# Patient Record
Sex: FEMALE | Race: WHITE | Employment: UNEMPLOYED | URBAN - METROPOLITAN AREA
[De-identification: names, ages, dates, MRNs, and addresses within clinical notes are randomized per-mention and may not be internally consistent; named-entity substitution may affect disease eponyms.]

---

## 2019-10-21 ENCOUNTER — HOSPITAL ENCOUNTER (OUTPATIENT)
Dept: PHYSICAL THERAPY | Age: 64
Discharge: HOME | End: 2019-10-21
Payer: OTHER GOVERNMENT

## 2019-10-21 PROCEDURE — 97162 PT EVAL MOD COMPLEX 30 MIN: CPT

## 2019-10-21 PROCEDURE — 97110 THERAPEUTIC EXERCISES: CPT

## 2019-10-21 PROCEDURE — 97140 MANUAL THERAPY 1/> REGIONS: CPT

## 2019-10-21 NOTE — PROGRESS NOTES
Eleanor Esqu[address]  [facility] PHYSICAL THERAPY  [address] [facility] Conrad Palumbo, [address] - Phone: [phone]  Fax: [phone] / [address] [facility]  Patient Name: Rowan Garcia : 1955   Medical   Diagnosis: Left knee pain [M25.562] Treatment Diagnosis: S/P Left knee arthroscopic surgery for meniscus tear   Onset Date: DOI 2019  DOS 19     Referral Source: Carol Marinelli MD [facility]): 10/21/2019   Prior Hospitalization: See medical history Provider #: [id number]   Prior Level of Function: Functionally independent and working pain free   Comorbidities: HTN, Diabetes, OA, Overweight, Tobacco Use   Medications: Verified on Patient Summary List   The Plan of Care and following information is based on the information from the initial evaluation.   ===========================================================================================  Assessment / key information: Patient is a 60 y.o. female presenting to clinic with CC left anteromadial knee pain since surgery on 19. She reports requirement for surgery due to her knee buckling underneath her in 2019, but had a delay in establishing surgery date. Patient states she have been avoiding many loaded activities since her surgery aside from when she is working her 2 jobs. She is at times reliant on medication to \"get through the end of the day\", but tries to avoid having to take her medicine. She is currently unable to negotiate stairs, curbs, perform transfers without UE assistance, or don/doff socks without difficulty. She demonstrates an antalgic gait, and presents with her left knee AROM 8-100 degrees, PROM 4-107 degrees . Pt  will benefit from physical therapist management to address her impairments (listed below),  educate her, and improve her level of function.  Thanks for your referral. ===========================================================================================  Eval Complexity: History: MEDIUM  Complexity : 1-2 comorbidities / personal factors will impact the outcome/ POC Exam:MEDIUM Complexity : 3 Standardized tests and measures addressing body structure, function, activity limitation and / or participation in recreation  Presentation: MEDIUM Complexity : Evolving with changing characteristics  Clinical Decision Making:MEDIUM Complexity : FOTO score of 26-74Overall Complexity:MEDIUM  Problem List: pain affecting function, decrease ROM, decrease strength, impaired gait/ balance, decrease ADL/ functional abilitiies, decrease activity tolerance, decrease flexibility/ joint mobility and decrease transfer abilities  FOTO score: 24 indicating 76% functional disability  Treatment Plan may include any combination of the following: Therapeutic exercise, Therapeutic activities, Neuromuscular re-education, Physical agent/modality, Gait/balance training, Manual therapy, Aquatic therapy, Patient education, Self Care training, Functional mobility training, Home safety training and Stair training  Patient / Family readiness to learn indicated by: asking questions, trying to perform skills and interest  Persons(s) to be included in education: patient (P)  Barriers to Learning/Limitations: None  Measures taken: n/a   Patient Goal (s): \"walk normally\"   Patient self reported health status: good  Rehabilitation Potential: good   Short Term Goals: To be accomplished in  3  weeks:  1. Patient to be adherent to HEP to facilitate pain control with ADL's.  2. Patient to increase right knee AROM flexion to > 120 degrees to facilitate ease in doning/doffing. 3. Patient to improve 5x Sit <--> Stand score to < 20\" to indicate reduced risk for falls.  Long Term Goals: To be accomplished in  6  weeks:  1. Patient to be Safe and Independent with HEP to self-manage/prevent symptoms after DC.   2. Patient to demonstrate left LE SLS > 10\" to indicate increased safety and ability to maintain balance in preparation for cruise. 3. Patient to increase FOTO score to > 49 to indicate improved functional independence. 4. Patient to demonstrate safe stair negotiation with < 1UE assist in reciprocal pattern to facilitate ease in community mobility. 5. Patient to demonstrate a normalized gait with LRAD on even surfaces x > 200'. Frequency / Duration:   Patient to be seen  3  times per week for 6  weeks:  Patient / Caregiver education and instruction: activity modification and exercises. We reviewed our facility's Patient Personal Responsibilities (PPR) form, particularly in regards to compliance towards her appointment time, our attendance policy, and her home exercise program. Patient was informed of possible discharge for non-compliance to our attendance policy per PPR form. We also discussed her POC as deemed appropriate by the treating therapist and physician. Patient verbalized understanding that she must show objective and functional improvement in an appropriate time frame. Patient verbalized understanding that should progress or compliance be lacking, we will contact the referring physician for further consultation to address and attempt to establish alternate treatment strategies as necessary and/or possibly discharge. Therapist Signature: Byron Lauelor \"BJ\" Abe Sims DPT, Cert. MDT, Cert. DN, Cert. SMT Date: 35/11/7473   Certification Period: n/a Time: 3:17 PM   ===========================================================================================  I certify that the above Physical Therapy Services are being furnished while the patient is under my care. I agree with the treatment plan and certify that this therapy is necessary. Physician Signature:        Date:       Time:     Please sign and return to [facility] [employer] or you may fax the signed copy to [phone]. Thank you.

## 2019-10-21 NOTE — PROGRESS NOTES
PHYSICAL THERAPY - DAILY TREATMENT NOTE  Patient Name: Gold Donnelly        Date: 10/21/2019  : 1955   [x]  Patient  Verified  Visit #:   1     Insurance: Payor: DAVID / Plan: Ying Galan / Product Type:  /      In time:   2:33          Out time:   3:30 Total Treatment Time (min):   57   Medicare Time Tracking (below)   Total Timed Codes (min):  n/a 1:1 Treatment Time:  n/a     SUBJECTIVE    Pain Level (on 0 to 10 scale):  2  / 10   Medication Changes/New allergies or changes in medical history, any new surgeries or procedures? []  No    []  Yes   If yes, update Summary List:    Subjective Functional Status/Changes:  []  No changes reported     HISTORY    Present Symptoms: Left anteromedial knee pain    Present since: 2019,  [] Improving []  Unchanging []  Worsening          Commenced as a result of:    or []  No apparent reason   or Surgery DOS 2019. Reports did not have a follow-up with surgeon until 3 weeks post-op. Patient very upset with MD. States MD asked \"do you think you need PT?\". Patient reports it was a headache getting scheduled for sx. Had originally wanted to have surgery with [facility] as patient had prior sx on right knee but they no longer accept . Called david and was referred to Anurag Pineda. Constant symptoms: Ache, burning sensation    Intermittent symptoms: sharp pain    What produces or worsens: stair, curbs, donning/doffing shoes/socks.    Mornings are the worst.    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 Leisure: Mechanical Stresses: yard work. Functional disability from present episode: per above, at times has to take medicine to finish shift.      Summary:    [] Acute []  Sub-acute []  Chronic     [x] Trauma/Surgery []  Insidious onset        OBJECTIVE    Gait:  [] Normal    [] Abnormal    [x] Antalgic    [] NWB    Device:        Myotome Level Muscles Nerve Reflex Sensation Action   L1-L3 Iliopsoas T12/L1-3  Quadriceps L2-4  Adductors L2-L4 (Iliacus)- Femoral  Femoral  Obturator/Sciatic N/A  L3-4 = Patella L1- Inguinal Crease  L2- Anterior Thigh  L3- Anterior Thigh above knee Hip Flexion  Knee Extension   L4 Tibialis anterior L4&5   Deep Peroneal Patella Anterior Knee Suprapatellar Ankle Dorsiflexion   L5 Extensor Hallucis Longus  Extensor Digitorum Lungus  Gluteus Medius Deep Peroneal         Superior Gluteal None Reliable Dorsum of Foot/Great Toe  Anterior Shank Extensor  to Great Toe        Hip Internal Rotation   S1 Peroneus Longus  Gastrocnemius & Soleus  Gluteus Manjinder Superficial Peroneal  Tibial    Inferior Gluteal Achilles Tendon Lateral Shank around Lateral Malleolus  Lateral Aspect/Dorsum of GT   Plantar Flexion      Hip Extension   Notable findings from above: unremarkable    ROM / Strength  [] Unable to assess                  AROM                      PROM                   Strength (1-5)    Left Right Left Right Left Right   Hip Flexion     4- 4+    Extension     4- 4+    Abduction     4- 4+    Adduction     4- 4+         AROM          PROM                      Strength (1-5)    Left Right Left Right Left Right   Knee Flexion 100 123 107 128 5 5    Extension 8 0 4 0 3 5       Flexibility: [] Unable to assess at this time  Hamstrings:    (L) Tightness= [] WNL   [] Min   [] Mod   [] Severe    (R) Tightness= [] WNL   [] Min   [] Mod   [] Severe  Quadriceps:    (L) Tightness= [] WNL   [] Min [x] Mod   [] Severe    (R) Tightness= [x] WNL   [] Min   [] Mod   [] Severe  Gastroc:      (L) Tightness= [] WNL   [] Min   [x] Mod   [] Severe    (R) Tightness= [x] WNL   [] Min   [] Mod   [] Severe  Other:    Palpation:   Neg/Pos  Neg/Pos  Neg/Pos   Joint Line  Quad tendon  Patellar ligament    Patella  Fibular head  Pes Anserinus    Tibial tubercle  Hamstring tendons  Infrapatellar fat pad      Optional Tests:  Patellar Positioning (Static)   []L []R Normal []L []R Lateral   []L []R Pete Heys      []L []R Medial   []L []R [place]    Patellar Tracking   []L []R Glide (Lat)   []L []R Tilt (Lat)     []L []R Glide (Med)  []L []R Tilt (Med)      []L []R Tile (Inf)     Patellar Mobility   []L []R Hypermobile []L []R Hypomobile           Therapeutic Procedures:  Min Procedure Specifics + Rationale   n/a [x]  Patient Education (performed throughout session) [x] Review HEP    [] Progressed/Changed HEP based on:   [] proper performance and advancement of Therex/TA   [] reduction in pain level    [] increased functional capacity       [] change in directional preference   10 [x] Therapeutic Exercise   [x]  See Flowsheet   Rationale: increase ROM and increase strength to improve the patients ability to participate in ADL's    8 [x]  Manual Therapy [] STM/DTM     [] IASTM     [] Scar Massage  [] Cross Friction       [] PNF         [] PROM    [] TDN (see objective; actual needle insertion time not billed)   [] Joint mobilization: Gr I [] II []  III [] IV[] V[]:  Involved Knee Patellar G2-G4 mobs inferior/medial, STM to quads/HS/ITB interface, Ant/Post G3-G4 Tibofemoral glides, PROM flexion/Ext contract/relax  Rationale: decrease pain, increase ROM, increase tissue extensibility, decrease trigger points and increase postural awareness to attain functional use and participation with ADL's       [x] Skin assessment post-treatment:  [x]intact [x]redness- no adverse reaction       []redness  adverse reaction:         Post Treatment Pain Level (on 0 to 10) scale:   2 / 10     ASSESSMENT    Assessment/Changes in Function:   Justification for Eval Code Complexity:  Patient History : mod  Examination high  Clinical Presentation: mod  Clinical Decision Making : FOTO high   []  See Progress Note/Recertification   Patient will continue to benefit from skilled PT services to  modify and progress therapeutic interventions, address functional mobility deficits, address ROM deficits, address strength deficits, analyze and address soft tissue restrictions, analyze and cue movement patterns, analyze and modify body mechanics/ergonomics, assess and modify postural abnormalities and instruct in home and community integration to attain remaining goals       [x]  Upgrade activities as tolerated []  Continue plan of care   []  Discharge due to :    [x]  Other: Initiate POC     Therapist: Bev Gold \"YAMILKA\" Clara Becker DPT, Cert. MDT, Cert. DN, Cert.  SMT Date: 10/21/2019     Time: 2:34 PM

## 2019-10-23 ENCOUNTER — HOSPITAL ENCOUNTER (OUTPATIENT)
Dept: PHYSICAL THERAPY | Age: 64
Discharge: HOME | End: 2019-10-23
Payer: OTHER GOVERNMENT

## 2019-10-23 PROCEDURE — 97110 THERAPEUTIC EXERCISES: CPT

## 2019-10-23 NOTE — PROGRESS NOTES
PHYSICAL THERAPY - DAILY TREATMENT NOTE    Patient Name: Liz Emerson        Date: 10/23/2019  : 1955   YES Patient  Verified  Visit #:   2  of   8  Insurance: Payor: DAVID / Plan: Benjy Tyler 74 / Product Type:  /      In time: 1148 Out time: 344   Total Treatment Time: 30     Medicare/BCBS [place] Time Tracking (below)   Total Timed Codes (min):  NA 1:1 Treatment Time:  NA     TREATMENT AREA =  Left knee pain [M25.562]    SUBJECTIVE    Pain Level (on 0 to 10 scale):  6 / 10   Medication Changes/New allergies or changes in medical history, any new surgeries or procedures? NO    If yes, update Summary List   Subjective Functional Status/Changes:  []  No changes reported     \"It hurts, but I think it's the exercises. \"        OBJECTIVE  Therapeutic Procedures:  Min Procedure Specifics + Rationale   n/a [x]  Patient Education (performed throughout session) [x] Review HEP    [] Progressed/Changed HEP based on:   [] proper performance and advancement of Therex/TA   [] reduction in pain level    [] increased functional capacity       [] change in directional preference   30 [x] Therapeutic Exercise   [x]  See Flowsheet   Rationale: increase ROM and increase strength to improve the patients ability to participate in ADL's        Other Objective/Functional Measures:  Strong quad set LLE  Reviewed HEP of quad sets and heel slides  Added new exercises to HEP  Pain limiting WB exercises and activity tolerance     Post Treatment Pain Level (on 0 to 10) scale:  7  / 10     ASSESSMENT    Assessment/Changes in Function:   Increased left knee ROM to aid in gait mechanics      []  See Progress Note/Recertification   Patient will continue to benefit from skilled PT services to analyze, cue, progress, modify, demonstrate, instruct, and address, movement patterns, therapeutic interventions, postural abnormalities, soft tissue restrictions, ROM, strength, functional mobility, body mechanics/ergonomics, and home and community integration to attain remaining goals. Progress toward goals / Updated goals:  Progressing; initiated exercise program for lower extremity strength and flexibility  · Short Term Goals: To be accomplished in  3  weeks:  1. Patient to be adherent to HEP to facilitate pain control with ADL's.  2. Patient to increase right knee AROM flexion to > 120 degrees to facilitate ease in doning/doffing. 3. Patient to improve 5x Sit <--> Stand score to < 20\" to indicate reduced risk for falls. · Long Term Goals: To be accomplished in  6  weeks:  1. Patient to be Safe and Independent with HEP to self-manage/prevent symptoms after DC. 2. Patient to demonstrate left LE SLS > 10\" to indicate increased safety and ability to maintain balance in preparation for cruise. 3. Patient to increase FOTO score to > 49 to indicate improved functional independence. 4. Patient to demonstrate safe stair negotiation with < 1UE assist in reciprocal pattern to facilitate ease in community mobility. 5. Patient to demonstrate a normalized gait with LRAD on even surfaces x > 200'.      PLAN    [x]  Upgrade activities as tolerated YES Continue plan of care   []  Discharge due to :    []  Other:      Therapist: Severiano Montes, PT, DPT    Date: 10/23/2019 Time: 2:22 PM     Future Appointments   Date Time Provider Tristian Flores   10/23/2019  2:30 PM [address]   11/1/2019  1:00 PM Sofiya Diane [address] [place] [address]   11/4/2019  2:00 PM Sofiya Diane [address] [place] [address]   11/6/2019  2:00 PM Sofiya Diane PT [facility]   11/8/2019  1:30 PM Sofiya Diane PT [facility]   11/11/2019  2:00 PM Sofiya Diane [address] [place] [address]   11/13/2019  2:00 PM Sofiya Diane [address] [place] [address]   11/18/2019  2:00 PM Sofiya Diane [address] [place] [address]   11/20/2019  2:00 PM Sofiya Diane PT [facility]   11/22/2019  1:30 PM Sofiya Diane [address] [place] [address]   11/25/2019  2:00 PM Tarsha Moreno [facility]   11/27/2019  2:00 PM Sofiya Diane PT [facility]

## 2019-11-01 ENCOUNTER — HOSPITAL ENCOUNTER (OUTPATIENT)
Dept: PHYSICAL THERAPY | Age: 64
Discharge: HOME | End: 2019-11-01
Payer: OTHER GOVERNMENT

## 2019-11-01 PROCEDURE — 97140 MANUAL THERAPY 1/> REGIONS: CPT

## 2019-11-01 PROCEDURE — 97110 THERAPEUTIC EXERCISES: CPT

## 2019-11-01 NOTE — PROGRESS NOTES
PHYSICAL THERAPY - DAILY TREATMENT NOTE    Patient Name: Sylvia Moncada        Date: 2019  : 1955   YES Patient  Verified  Visit #:   3   of   8  Insurance: Payor: DAVID / Plan: Benjy Tyler 74 / Product Type:  /      In time: 1:03 Out time: 1:35   Total Treatment Time: 32     TREATMENT AREA = Left knee pain [M25.562]    SUBJECTIVE    Pain Level (on 0 to 10 scale):  2  / 10   Medication Changes/New allergies or changes in medical history, any new surgeries or procedures? NO    If yes, update Summary List   Subjective Functional Status/Changes:  []  No changes reported     \"I've been doing the exercises and think they've helped me tremendously. I was actually able to go down the steps normally today. It was slow but a lot better than before. \"          OBJECTIVE    Therapeutic Procedures:  Min Procedure Specifics + Rationale   n/a [x]  Patient Education (performed throughout session) [x] Review HEP    [] Progressed/Changed HEP based on:   [] proper performance and advancement of Therex/TA   [] reduction in pain level    [] increased functional capacity       [] change in directional preference   24 [x] Therapeutic Exercise   [x]  See Flowsheet   Rationale: increase ROM and increase strength to improve the patients ability to participate in ADL's    8 []  Manual Therapy       [] IASTM  Involved Knee Patellar G2-G4 mobs inferior/medial, STM to quads/HS/ITB interface, Ant/Post G3-G4 Tibofemoral glides, PROM flexion/Ext contract/relax  [] TDN (see objective; actual needle insertion time not billed)     Rationale: decrease pain, increase ROM, increase tissue extensibility, decrease trigger points and increase postural awareness to attain functional use and participation with ADL's  [x] Skin assessment post-treatment:  [x]intact []redness- no adverse reaction   []redness  adverse         Other Objective/Functional Measures:    Increased reps/sets/resistance per flow sheet.        Post Treatment Pain Level (on 0 to 10) scale:   0  / 10     ASSESSMENT    Assessment/Changes in Function:     Responded well towards manual to facilitate mobility and pain relief. Patient will continue to benefit from skilled PT services to modify and progress therapeutic interventions, address functional mobility deficits, address ROM deficits, address strength deficits, analyze and address soft tissue restrictions, analyze and cue movement patterns, analyze and modify body mechanics/ergonomics, assess and modify postural abnormalities, address imbalance/dizziness and instruct in home and community integration  to attain remaining goals   Progress toward goals / Updated goals:    Progressing well with HEP compliance and strengthening     PLAN    [x]  Upgrade activities as tolerated  [x]  Update interventions per flow sheet YES Continue plan of care   []  Discharge due to :    []  Other:      Therapist: Cuca Jacques \"YAMILKA\" Lory Leon DPT, Cert. MDT, Cert. DN, Cert.  SMT    Date: 11/1/2019 Time: 1:18 PM     Future Appointments   Date Time Provider Tristian Flores   11/4/2019  2:00 PM Candis Fortune [facility]   11/6/2019  2:00 PM Candis Fortune [facility]   11/8/2019  1:30 PM Candis Martinezs, [address] [place] [address]   11/11/2019  2:00 PM Candis Martinezs, [address] [place] [address]   11/13/2019  2:00 PM Candis Martinezs, [address] [place] [address]   11/18/2019  2:00 PM Candis [address]   11/20/2019  2:00 PM Candis Fortune [facility]   11/22/2019  1:30 PM Candis Martinezs, [address] [place] [address]   11/25/2019  2:00 PM Candis [address]   11/27/2019  2:00 PM Candis Fortune [facility]

## 2019-11-06 ENCOUNTER — HOSPITAL ENCOUNTER (OUTPATIENT)
Dept: PHYSICAL THERAPY | Age: 64
Discharge: HOME | End: 2019-11-06
Payer: OTHER GOVERNMENT

## 2019-11-06 PROCEDURE — 97530 THERAPEUTIC ACTIVITIES: CPT

## 2019-11-06 PROCEDURE — 97110 THERAPEUTIC EXERCISES: CPT

## 2019-11-06 NOTE — PROGRESS NOTES
PHYSICAL THERAPY - DAILY TREATMENT NOTE    Patient Name: Jarad Baker        Date: 2019  : 1955   YES Patient  Verified  Visit #:   5   of   8  Insurance: Payor: DAVDI / Plan: Benjy Tyler 74 / Product Type:  /      In time: 1:53 Out time: 2:35   Total Treatment Time: 42     TREATMENT AREA = Left knee pain [M25.562]    SUBJECTIVE    Pain Level (on 0 to 10 scale):  2  / 10   Medication Changes/New allergies or changes in medical history, any new surgeries or procedures? NO    If yes, update Summary List   Subjective Functional Status/Changes:  []  No changes reported     \"I was fine when I left here but about 2 hours later I was sore.  It's definitely eased up\"          OBJECTIVE    Therapeutic Procedures:  Min Procedure Specifics + Rationale   n/a [x]  Patient Education (performed throughout session) [x] Review HEP    [] Progressed/Changed HEP based on:   [] proper performance and advancement of Therex/TA   [] reduction in pain level    [] increased functional capacity       [] change in directional preference   23 [x] Therapeutic Exercise   [x]  See Flowsheet   Rationale: increase ROM and increase strength to improve the patients ability to participate in ADL's      9 min Therapeutic Activity: Posture, positioning, and transfers     Rationale:    improve coordination, improve balance and increase proprioception        Modality rationale: decrease inflammation, decrease pain, increase tissue extensibility and increase muscle contraction/control to improve the patients ability to perform ADL's with greater ease       Min Type Additional Details   10 [x]  Cold Pack   [] pre-LAURA      [x] post-LAURA  []  Ice massage Location:left knee    [x] supine              [] prone  [] legs elevated    [x] legs flat   [] sitting   [x] Skin assessment post-treatment:  [x]intact [x]redness- no adverse reaction       []redness  adverse reaction:     Other Objective/Functional Measures:    Increased reps/sets/resistance per flow sheet. Post Treatment Pain Level (on 0 to 10) scale:   1  / 10     ASSESSMENT    Assessment/Changes in Function:     Performed/participated in treatment well without complaints aside from muscular fatigue/deconditioning, indicating (+) response to current course of treatment to further improve functional status. Patient will continue to benefit from skilled PT services to modify and progress therapeutic interventions, address functional mobility deficits, address ROM deficits, address strength deficits, analyze and address soft tissue restrictions, analyze and cue movement patterns, analyze and modify body mechanics/ergonomics and instruct in home and community integration  to attain remaining goals   Progress toward goals / Updated goals:    Improving in ROM for donning/doffing     PLAN    [x]  Upgrade activities as tolerated  [x]  Update interventions per flow sheet YES Continue plan of care   []  Discharge due to :    []  Other:      Therapist: Dianna Salguero \"BJ\" [address], DPT, Cert. MDT, Cert. DN, Cert.  SMT    Date: 11/6/2019 Time: 2:12 PM     Future Appointments   Date Time Provider Tristian Flores   11/8/2019  1:30 PM Rhiannon Sheerer, [address] [place] [address]   11/11/2019  2:00 PM Rhiannon Sheerer, PT [facility]   11/13/2019  2:00 PM Rhiannon Sheerer, [address] [place] [address]   11/18/2019  2:00 PM Rhiannon Sheerer, [address] [place] [address]   11/20/2019  2:00 PM Rhiannonjohn Powerr, PT [facility]   11/22/2019  1:30 PM Rhiannon Sheerer, [address] [place] [address]   11/25/2019  2:00 PM Rhiannon Sheerer, [address] [place] [address]   11/27/2019  2:00 PM Rhiannon Sheerer, PT [facility]

## 2019-11-08 ENCOUNTER — HOSPITAL ENCOUNTER (OUTPATIENT)
Dept: PHYSICAL THERAPY | Age: 64
Discharge: HOME | End: 2019-11-08
Payer: OTHER GOVERNMENT

## 2019-11-08 PROCEDURE — 97110 THERAPEUTIC EXERCISES: CPT

## 2019-11-08 PROCEDURE — 97530 THERAPEUTIC ACTIVITIES: CPT

## 2019-11-08 NOTE — PROGRESS NOTES
PHYSICAL THERAPY - DAILY TREATMENT NOTE    Patient Name: Shawn Roles        Date: 2019  : 1955   YES Patient  Verified  Visit #:     Insurance: Payor: DAVID / Plan: Benjy Tyler 74 / Product Type:  /      In time: 1:25 Out time: 2:05   Total Treatment Time: 40     TREATMENT AREA = Left knee pain [M25.562]    SUBJECTIVE    Pain Level (on 0 to 10 scale):  0  / 10   Medication Changes/New allergies or changes in medical history, any new surgeries or procedures? NO    If yes, update Summary List   Subjective Functional Status/Changes:  []  No changes reported     \"Felt pretty good yesterday and today I've had no pain at all. \"          OBJECTIVE    Therapeutic Procedures:  Min Procedure Specifics + Rationale   n/a [x]  Patient Education (performed throughout session) [x] Review HEP    [] Progressed/Changed HEP based on:   [] proper performance and advancement of Therex/TA   [] reduction in pain level    [] increased functional capacity       [] change in directional preference   32 [x] Therapeutic Exercise   [x]  See Flowsheet   Rationale: increase ROM and increase strength to improve the patients ability to participate in ADL's    8 [x] Therapeutic Activity   [x]  See Flowsheet  Step ups, HK,retro,sidestep  Rationale: To improve safety, proprioception, coordination, and efficiency with tasks         Other Objective/Functional Measures:    Increased reps/sets/resistance per flow sheet. Post Treatment Pain Level (on 0 to 10) scale:   0  / 10     ASSESSMENT    Assessment/Changes in Function:     Notable poor proprioception and kinesthetic awareness with ambulation activities.          Patient will continue to benefit from skilled PT services to address functional mobility deficits, address ROM deficits, address strength deficits, analyze and address soft tissue restrictions, analyze and cue movement patterns, analyze and modify body mechanics/ergonomics and instruct in home and community integration  to attain remaining goals   Progress toward goals / Updated goals:    Improving well in pain and strength      PLAN    [x]  Upgrade activities as tolerated  [x]  Update interventions per flow sheet YES Continue plan of care   []  Discharge due to :    []  Other:      Therapist: Heather Chen \"BJ\" ROSY Tse, Cert. MDT, Cert. DN, Cert.  SMT    Date: 11/8/2019 Time: 1:28 PM     Future Appointments   Date Time Provider Tristian Flores   11/8/2019  1:30 PM Lionel Hernandez [facility]   11/11/2019  2:00 PM Lionel Hernandez [facility]   11/13/2019  2:00 PM Lionel Hernandez [address] [place] [address]   11/18/2019  2:00 PM Lionel Hernandez, [address] [place] [address]   11/20/2019  2:00 PM Lionel Hernandez [facility]   11/22/2019  1:30 PM Lionel Hernandez [address] [place] [address]   11/25/2019  2:00 PM Lionel Hernandez [address] [place] [address]   11/27/2019  2:00 PM Lionel Hernandez [facility]

## 2019-11-11 ENCOUNTER — HOSPITAL ENCOUNTER (OUTPATIENT)
Dept: PHYSICAL THERAPY | Age: 64
Discharge: HOME | End: 2019-11-11
Payer: OTHER GOVERNMENT

## 2019-11-11 PROCEDURE — 97530 THERAPEUTIC ACTIVITIES: CPT

## 2019-11-11 PROCEDURE — 97110 THERAPEUTIC EXERCISES: CPT

## 2019-11-11 NOTE — PROGRESS NOTES
PHYSICAL THERAPY - DAILY TREATMENT NOTE    Patient Name: Rasheeda Bacon        Date: 2019  : 1955   YES Patient  Verified  Visit #:   7   of   8  Insurance: Payor: DAVID / Plan: Benjy Tyler 74 / Product Type:  /      In time: 2:06 Out time: 2:43   Total Treatment Time: 37       TREATMENT AREA = Left knee pain [M25.562]    SUBJECTIVE    Pain Level (on 0 to 10 scale):  2  / 10   Medication Changes/New allergies or changes in medical history, any new surgeries or procedures? 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 I'm not as slow getting in and out of the car. I'm better at that. \"          OBJECTIVE     Therapeutic Procedures:  Min Procedure Specifics + Rationale   n/a [x]  Patient Education (performed throughout session) [x] Review HEP    [] Progressed/Changed HEP based on:   [] proper performance and advancement of Therex/TA   [] reduction in pain level    [] increased functional capacity       [] change in directional preference   27 [x] Therapeutic Exercise   [x]  See Flowsheet   Rationale: increase ROM and increase strength to improve the patients ability to participate in ADL's    10(10) [x] Therapeutic Activity   [x]  See Flowsheet  Zig Zag, in/out sidestep, in/out forward, sidestepping, HK  Sit <-->stand transfers  MSR to GT EO/EC  Rationale: To improve safety, proprioception, coordination, and efficiency with tasks     Modality rationale: decrease inflammation, decrease pain, increase tissue extensibility and increase muscle contraction/control to improve the patients ability to perform ADL's with greater ease       Min Type Additional Details       Other Objective/Functional Measures: LoB x2 sidestepping to left.       Post Treatment Pain Level (on 0 to 10) scale:   0  / 10     ASSESSMENT    Assessment/Changes in Function:       Will re-assess NV         Patient will continue to benefit from skilled PT services to modify and progress therapeutic interventions, address ROM deficits, analyze and address soft tissue restrictions, analyze and cue movement patterns, analyze and modify body mechanics/ergonomics, assess and modify postural abnormalities, address imbalance/dizziness and instruct in home and community integration  to attain remaining goals   Progress toward goals / Updated goals: Will re-assess NV       PLAN    [x]  Upgrade activities as tolerated  [x]  Update interventions per flow sheet YES Continue plan of care   []  Discharge due to :    []  Other:      Therapist: Rolo Morfin \"BJ\" Dedrick, [place], Cert. MDT, Cert. DN, Cert.  SMT    Date: 11/11/2019 Time: 1:50 PM     Future Appointments   Date Time Provider Tristian Flores   11/11/2019  2:00 PM Kyle Velásquez [facility]   11/13/2019  2:00 PM Kyle Velásquez [address] [place] [address]   11/18/2019  2:00 PM Kyle Velásquez [address] [place] [address]   11/20/2019  2:00 PM Kyle Velásquez PT [facility]   11/22/2019  1:30 PM Kyle Velásquez [address] [place] [address]   11/25/2019  2:00 PM Kyle Velásquez [address] [place] [address]   11/27/2019  2:00 PM Kyle Velásquez [facility]

## 2019-11-13 ENCOUNTER — HOSPITAL ENCOUNTER (OUTPATIENT)
Dept: PHYSICAL THERAPY | Age: 64
Discharge: HOME | End: 2019-11-13
Payer: OTHER GOVERNMENT

## 2019-11-13 PROCEDURE — 97110 THERAPEUTIC EXERCISES: CPT

## 2019-11-13 NOTE — PROGRESS NOTES
PHYSICAL THERAPY - DAILY TREATMENT NOTE    Patient Name: Kat Blair        Date: 2019  : 1955   YES Patient  Verified  Visit #:     Insurance: Payor:  / Plan: Benjy Tyler 74 / Product Type:  /      In time: 2:00 Out time: 2:40   Total Treatment Time: 40     TREATMENT AREA = Left knee pain [M25.562]    SUBJECTIVE    Pain Level (on 0 to 10 scale):  0  / 10   Medication Changes/New allergies or changes in medical history, any new surgeries or procedures? NO    If yes, update Summary List   Subjective Functional Status/Changes:  []  No changes reported     \"I was sore yesterday but more b/c of the weather I think. \"          OBJECTIVE    Therapeutic Procedures:  Min Procedure Specifics + Rationale   n/a [x]  Patient Education (performed throughout session) [x] Review HEP    [] Progressed/Changed HEP based on:   [] proper performance and advancement of Therex/TA   [] reduction in pain level    [] increased functional capacity       [] change in directional preference   40 [x] Therapeutic Exercise   [x]  See Flowsheet   Rationale: increase ROM and increase strength to improve the patients ability to participate in ADL's          Other Objective/Functional Measures:    Increased reps/sets/resistance per flow sheet. Added step ups  Added captain morgans   Post Treatment Pain Level (on 0 to 10) scale:   0  / 10     ASSESSMENT    Assessment/Changes in Function:     Performed/participated in treatment well without complaints aside from muscular fatigue/deconditioning, indicating (+) response to current course of treatment to further improve functional status.             Patient will continue to benefit from skilled PT services to modify and progress therapeutic interventions, address functional mobility deficits, address ROM deficits, address strength deficits, analyze and address soft tissue restrictions, analyze and cue movement patterns, analyze and modify body mechanics/ergonomics and instruct in home and community integration  to attain remaining goals   Progress toward goals / Updated goals:    Excellent progress in level of ADL tolerance     PLAN    [x]  Upgrade activities as tolerated  [x]  Update interventions per flow sheet YES Continue plan of care   []  Discharge due to :    []  Other:      Therapist: Heather Chen \"BJ\" ROSY Tse, Cert. MDT, Cert. DN, Cert.  SMT    Date: 11/13/2019 Time: 2:08 PM     Future Appointments   Date Time Provider Tristian Flores   11/18/2019  2:00 PM Lionel Hernandez [facility]   11/20/2019  2:00 PM Lionel Hernandez [facility]   11/22/2019  1:30 PM Lionel Hernandez [address] [place] [address]   11/25/2019  2:00 PM Lionel Hernandez [address] [place] [address]   11/27/2019  2:00 PM Lionel Hernandez PT [facility]

## 2019-11-18 ENCOUNTER — HOSPITAL ENCOUNTER (OUTPATIENT)
Dept: PHYSICAL THERAPY | Age: 64
End: 2019-11-18
Payer: OTHER GOVERNMENT

## 2019-11-20 ENCOUNTER — HOSPITAL ENCOUNTER (OUTPATIENT)
Dept: PHYSICAL THERAPY | Age: 64
Discharge: HOME | End: 2019-11-20
Payer: OTHER GOVERNMENT

## 2019-11-20 PROCEDURE — 97014 ELECTRIC STIMULATION THERAPY: CPT

## 2019-11-20 PROCEDURE — 97110 THERAPEUTIC EXERCISES: CPT

## 2019-11-20 PROCEDURE — 97140 MANUAL THERAPY 1/> REGIONS: CPT

## 2019-11-20 PROCEDURE — 97530 THERAPEUTIC ACTIVITIES: CPT

## 2019-11-20 NOTE — PROGRESS NOTES
PHYSICAL THERAPY - DAILY TREATMENT NOTE    Patient Name: Teofilo Romberg        Date: 2019  : 1955   YES Patient  Verified  Visit #:     Insurance: Payor: DAVID / Plan: Michele Shaffer / Product Type:  /      In time: 1:50 Out time: 2:43   Total Treatment Time: 53     TREATMENT AREA = Left knee pain [M25.562]    SUBJECTIVE    Pain Level (on 0 to 10 scale):  7   10   Medication Changes/New allergies or changes in medical history, any new surgeries or procedures? NO    If yes, update Summary List   Subjective Functional Status/Changes:  []  No changes reported     \"I don't know what's been going on but since last time I've been hurting a lot. I'm stressed out, too b/c my roof is leaking and I've got to get rid of a tree. \"          OBJECTIVE    Therapeutic Procedures:  Min Procedure Specifics + Rationale   n/a [x]  Patient Education (performed throughout session) [x] Review HEP    [] Progressed/Changed HEP based on:   [] proper performance and advancement of Therex/TA   [] reduction in pain level    [] increased functional capacity       [] change in directional preference   15 [x] Therapeutic Exercise   [x]  See Flowsheet   Rationale: increase ROM and increase strength to improve the patients ability to participate in ADL's    15 []  Manual Therapy       [] IASTM    [] TDN (see objective; actual needle insertion time not billed)   [x] Involved Knee Patellar G2-G4 mobs inferior/medial, STM to quads/HS/ITB interface, Ant/Post G3-G4 Tibofemoral glides, PROM flexion/Ext contract/relax    Rationale: decrease pain, increase ROM, increase tissue extensibility, decrease trigger points and increase postural awareness to attain functional use and participation with ADL's  [x] Skin assessment post-treatment:  [x]intact []redness- no adverse reaction   []redness  adverse   8 [x] Therapeutic Activity   [x]  See Flowsheet  Assessment for knee directional preference  Rationale:  To improve safety, proprioception, coordination, and efficiency with tasks     Modality rationale: decrease inflammation, decrease pain, increase tissue extensibility and increase muscle contraction/control to improve the patients ability to perform ADL's with greater ease       Min Type Additional Details   15 [x] E-Stim Type:Symmetrical Biphasic  Attended? NO Location: Left knee  [x] supine              [] prone  [x] legs elevated   [x] legs flat  []w/heat  [x]w/ice  [] sitting    [x] Skin assessment post-treatment:  [x]intact [x]redness- no adverse reaction       []redness  adverse reaction:     Other Objective/Functional Measures:  Patient provided educational material on road to recovery. See PN     Post Treatment Pain Level (on 0 to 10) scale:   1  / 10     ASSESSMENT    Assessment/Changes in Function:     See PN         Patient will continue to benefit from skilled PT services to modify and progress therapeutic interventions, address functional mobility deficits, address ROM deficits, address strength deficits, analyze and address soft tissue restrictions, analyze and cue movement patterns, analyze and modify body mechanics/ergonomics and instruct in home and community integration  to attain remaining goals   Progress toward goals / Updated goals:    See PN     PLAN    [x]  Upgrade activities as tolerated  [x]  Update interventions per flow sheet YES Continue plan of care   []  Discharge due to :    []  Other:      Therapist: Ashley Arthur \"BJ\" Eric Regalado DPT, Cert. MDT, Cert. DN, Cert.  SMT    Date: 11/20/2019 Time: 1:59 PM     Future Appointments   Date Time Provider Tristian Flores   11/20/2019  2:00 PM Elise Franks PT [facility]   11/22/2019  1:30 PM Elise Franks [address] [place] [address]   11/25/2019  2:00 PM Elise Franks [address] [place] [address]   11/27/2019  2:00 PM Elise Franks PT [facility]

## 2019-11-22 ENCOUNTER — HOSPITAL ENCOUNTER (OUTPATIENT)
Dept: PHYSICAL THERAPY | Age: 64
Discharge: HOME | End: 2019-11-22
Payer: OTHER GOVERNMENT

## 2019-11-22 PROCEDURE — 97110 THERAPEUTIC EXERCISES: CPT

## 2019-11-22 PROCEDURE — 97014 ELECTRIC STIMULATION THERAPY: CPT

## 2019-11-22 PROCEDURE — 97530 THERAPEUTIC ACTIVITIES: CPT

## 2019-11-22 NOTE — PROGRESS NOTES
PHYSICAL THERAPY - DAILY TREATMENT NOTE    Patient Name: Kat Blair        Date: 2019  : 1955   YES Patient  Verified  Visit #:   10   of   18  Insurance: Payor: DAVID / Plan: Benjy Tyler 74 / Product Type:  /      In time: 1:42 Out time: 2:30   Total Treatment Time: 48     TREATMENT AREA = Left knee pain [M25.562]    SUBJECTIVE    Pain Level (on 0 to 10 scale):  1  / 10   Medication Changes/New allergies or changes in medical history, any new surgeries or procedures? NO    If yes, update Summary List   Subjective Functional Status/Changes:  []  No changes reported     \"sorry I'm late. I thought my appointment was at 2 like usual. I've barely had any pain since last time. \"          OBJECTIVE    Therapeutic Procedures:  Min Procedure Specifics + Rationale   n/a [x]  Patient Education (performed throughout session) [x] Review HEP    [] Progressed/Changed HEP based on:   [] proper performance and advancement of Therex/TA   [] reduction in pain level    [] increased functional capacity       [] change in directional preference   10 [x] Therapeutic Exercise   [x]  See Flowsheet   Rationale: increase ROM and increase strength to improve the patients ability to participate in ADL's    25 [x] Therapeutic Activity   [x]  See Flowsheet  Tap ups, HK/Retro/Sidestep, tandem  Rationale: To improve safety, proprioception, coordination, and efficiency with tasks     Modality rationale: decrease inflammation, decrease pain, increase tissue extensibility and increase muscle contraction/control to improve the patients ability to perform ADL's with greater ease       Min Type Additional Details   15 [x] E-Stim Type:IFC  Attended? NO Location: Left Knee  [] supine              [] prone  [] legs elevated   [] legs flat  []w/heat  []w/ice  [] sitting    [x] Skin assessment post-treatment:  [x]intact [x]redness- no adverse reaction       []redness  adverse reaction:     Other Objective/Functional Measures:    Increased reps/sets/resistance per flow sheet. Post Treatment Pain Level (on 0 to 10) scale:   0  / 10     ASSESSMENT    Assessment/Changes in Function:     Improved standing activity tolernace         Patient will continue to benefit from skilled PT services to modify and progress therapeutic interventions, address functional mobility deficits, address ROM deficits, address strength deficits, analyze and address soft tissue restrictions, analyze and cue movement patterns, analyze and modify body mechanics/ergonomics and instruct in home and community integration  to attain remaining goals   Progress toward goals / Updated goals:    See PN     PLAN    [x]  Upgrade activities as tolerated  [x]  Update interventions per flow sheet YES Continue plan of care   []  Discharge due to :    []  Other:      Therapist: Erum Leo \"BJ\" Severa Pike, DPT, Cert. MDT, Cert. DN, Cert.  SMT    Date: 11/22/2019 Time: 1:59 PM     Future Appointments   Date Time Provider Tristian Flores   11/25/2019  2:00 PM Pako Mcgrath PT [facility]   11/27/2019  2:00 PM Pako Mcgrath PT [facility]

## 2019-11-25 ENCOUNTER — HOSPITAL ENCOUNTER (OUTPATIENT)
Dept: PHYSICAL THERAPY | Age: 64
Discharge: HOME | End: 2019-11-25
Payer: OTHER GOVERNMENT

## 2019-11-25 PROCEDURE — 97530 THERAPEUTIC ACTIVITIES: CPT

## 2019-11-25 PROCEDURE — 97110 THERAPEUTIC EXERCISES: CPT

## 2019-11-25 NOTE — PROGRESS NOTES
PHYSICAL THERAPY - DAILY TREATMENT NOTE    Patient Name: Troy Stage        Date: 2019  : 1955   YES Patient  Verified  Visit #:     Insurance: Payor: DAVID / Plan: Benjy Tyler 74 / Product Type:  /      In time: 2:03 Out time: 2:38   Total Treatment Time: 35     TREATMENT AREA = Left knee pain [M25.562]    SUBJECTIVE    Pain Level (on 0 to 10 scale):  1  / 10   Medication Changes/New allergies or changes in medical history, any new surgeries or procedures? NO    If yes, update Summary List   Subjective Functional Status/Changes:  []  No changes reported     \"Saw my primary and I told her not to ever refer Dr. Shar Sandoval again. She said that if I'm not better in a few weeks she wants to do an MRI to make sure he did the surgery right. Someone must've been talking to Leisa because he apparently wanted to meet his referers. My PCP was\"          OBJECTIVE    Therapeutic Procedures:  Min Procedure Specifics + Rationale   n/a [x]  Patient Education (performed throughout session) [x] Review HEP    [] Progressed/Changed HEP based on:   [] proper performance and advancement of Therex/TA   [] reduction in pain level    [] increased functional capacity       [] change in directional preference   25 [x] Therapeutic Exercise   [x]  See Flowsheet   Rationale: increase ROM and increase strength to improve the patients ability to participate in ADL's    10 [x] Therapeutic Activity   [x]  See Flowsheet  HK/Retro/  Rationale:  To improve safety, proprioception, coordination, and efficiency with tasks       Other Objective/Functional Measures:    Discussed DC NV, patient agreeable       Post Treatment Pain Level (on 0 to 10) scale:   0  / 10     ASSESSMENT    Assessment/Changes in Function:     Improved SLS stance         Patient will continue to benefit from skilled PT services to modify and progress therapeutic interventions, address functional mobility deficits, address ROM deficits, address strength deficits, analyze and address soft tissue restrictions, analyze and cue movement patterns, analyze and modify body mechanics/ergonomics, assess and modify postural abnormalities, address imbalance/dizziness and instruct in home and community integration  to attain remaining goals   Progress toward goals / Updated goals:    Progressing towards DC     PLAN    [x]  Upgrade activities as tolerated  [x]  Update interventions per flow sheet YES Continue plan of care   []  Discharge due to :    []  Other:      Therapist: Franco Coyle \"BJ\" ROSY Tse, Cert. MDT, Cert. DN, Cert.  SMT    Date: 11/25/2019 Time: 2:22 PM     Future Appointments   Date Time Provider Tristian Flores   11/27/2019  2:00 PM Perfecto Councilman, [facility] AT [facility]

## 2019-11-27 ENCOUNTER — HOSPITAL ENCOUNTER (OUTPATIENT)
Dept: PHYSICAL THERAPY | Age: 64
Discharge: HOME | End: 2019-11-27
Payer: OTHER GOVERNMENT

## 2019-11-27 PROCEDURE — 97530 THERAPEUTIC ACTIVITIES: CPT

## 2019-11-27 PROCEDURE — 97110 THERAPEUTIC EXERCISES: CPT

## 2019-11-27 NOTE — PROGRESS NOTES
Eleanor Kwon [address]  [facility] PHYSICAL THERAPY  [address] [facility] [address], [address] - Phone: [phone]  Fax: [phone]  DISCHARGE SUMMARY  Patient Name: Sylvia Moncada : 1955   Treatment/Medical Diagnosis: Left knee pain [M25.562]   Referral Source: Chaim Pillai MD     Date of Initial Visit: 10/21/19 Attended Visits: 12 Missed Visits: 0     SUMMARY OF TREATMENT  Patient's POC has consisted of therex, therapeutic activities, manual therapy prn, modalities prn, pt. education, and a comprehensive HEP. Treatment strategies used to address functional mobility deficits, ROM deficits, strength deficits, analyze and address soft tissue restrictions, analyze and cue movement patterns, analyze and modify body mechanics/ergonomics, assess and modify postural abnormalities and instruct in home and community integration. CURRENT STATUS  Patient made excellent progress in her recovery despite one incident of exacerbating pain after increased activity. She was able to progressively alleviate and abolish symptoms via PT, and returned to her norm of 0-1/10 pain on VAS. She reports CC as \"stiffness\" in am and \"soreness\" at end of the day, but is otherwise able to perform all ADL's without complaints. She currently demonstrates left knee AROM 0-125 degrees, PROM 0-130 degrees  5x Sit <-->stand score=  17\", compared to being unable to perform at [facility]. Her score indicates that she is freely mobile and is a reduced risk for falls. GOALS/MEASURE OF PROGRESS Goal Met? · Short Term Goals: To be accomplished in  3  weeks:  1. Patient to be adherent to HEP to facilitate pain control with ADL's.  2. Patient to increase right knee AROM flexion to > 120 degrees to facilitate ease in doning/doffing. 3. Patient to improve 5x Sit <--> Stand score to < 20\" to indicate reduced risk for falls. · Long Term Goals: To be accomplished in  6  weeks:  1.  Patient to be Safe and Independent with HEP to self-manage/prevent symptoms after DC. 2. Patient to demonstrate left LE SLS > 10\" to indicate increased safety and ability to maintain balance in preparation for cruise. 3. Patient to increase FOTO score to > 49 to indicate improved functional independence. 4. Patient to demonstrate safe stair negotiation with < 1UE assist in reciprocal pattern to facilitate ease in community      MET    MET      MET          MET      MET      MET      MET     RECOMMENDATIONS  Discontinue therapy. Progressing towards or have reached established goals. If you have any questions/comments please contact us directly at [phone]. Thank you for allowing us to assist in the care of your patient. Therapist Signature: Obi Guy \"BJ\" Arash Ortiz DPT, Cert. MDT, Cert. DN, Cert. SMT Date: 11/27/19   Reporting Period: n/a     Certification Period n/a Time: 2:21 PM     NOTE TO PHYSICIAN:  PLEASE COMPLETE THE ORDERS BELOW AND FAX TO   [facility]: [phone]  If you are unable to process this request in 24 hours please contact our office: [phone]    ___ I have read the above report and request that my patient continue as recommended.   ___ I have read the above report and request that my patient continue therapy with the following changes/special instructions:_________________________________________________________   ___ I have read the above report and request that my patient be discharged from therapy.      Physician Signature:        Date:     Time:

## 2019-11-27 NOTE — PROGRESS NOTES
PHYSICAL THERAPY - DAILY TREATMENT NOTE    Patient Name: Aydin Brewer        Date: 2019  : 1955   YES Patient  Verified  Visit #:     Insurance: Payor: DAVID / Plan: Benjy Tyler 74 / Product Type:  /      In time: 2:00 Out time: 2:32   Total Treatment Time: 32     TREATMENT AREA = Left knee pain [M25.562]    SUBJECTIVE    Pain Level (on 0 to 10 scale):  1  / 10   Medication Changes/New allergies or changes in medical history, any new surgeries or procedures? NO    If yes, update Summary List   Subjective Functional Status/Changes:  []  No changes reported     Ready to graduate\"          OBJECTIVE    Therapeutic Procedures:  Min Procedure Specifics + Rationale   n/a [x]  Patient Education (performed throughout session) [x] Review HEP    [] Progressed/Changed HEP based on:   [] proper performance and advancement of Therex/TA   [] reduction in pain level    [] increased functional capacity       [] change in directional preference   23 [x] Therapeutic Exercise   [x]  See Flowsheet   Rationale: increase ROM and increase strength to improve the patients ability to participate in ADL's    9 [x] Therapeutic Activity   [x]  See Flowsheet  Re-assessment and functional status  Rationale:  To improve safety, proprioception, coordination, and efficiency with tasks     [x] Skin assessment post-treatment:  [x]intact [x]redness- no adverse reaction       []redness  adverse reaction:     Other Objective/Functional Measures:    See DC     Post Treatment Pain Level (on 0 to 10) scale:   0  / 10     ASSESSMENT    Assessment/Changes in Function:     See DC         Patient will continue to benefit from skilled PT services to n/a  to attain remaining goals   Progress toward goals / Updated goals:    See DC     PLAN    [x]  Upgrade activities as tolerated  [x]  Update interventions per flow sheet NO Continue plan of care   []  Discharge due to :    []  Other:      Therapist: Juan R Inman \"BJ\" ROSY Tse, Cert. MDT, Cert. DN, Cert. SMT    Date: 11/27/2019 Time: 2:01 PM   No future appointments.

## 2023-09-18 ENCOUNTER — HOSPITAL ENCOUNTER (OUTPATIENT)
Facility: HOSPITAL | Age: 68
Setting detail: RECURRING SERIES
Discharge: HOME | End: 2023-09-21
Payer: MEDICARE

## 2023-09-18 PROCEDURE — 97140 MANUAL THERAPY 1/> REGIONS: CPT

## 2023-09-18 PROCEDURE — 97162 PT EVAL MOD COMPLEX 30 MIN: CPT

## 2023-09-18 PROCEDURE — 97110 THERAPEUTIC EXERCISES: CPT

## 2023-09-18 NOTE — PROGRESS NOTES
PT DAILY TREATMENT NOTE/SHOULDER EVAL     Patient Name: Keith Olson    Date: 2023    : 1955  Insurance: Payor: MEDICARE / Plan: MEDICARE PART A AND B / Product Type: *No Product type* /      Patient  verified yes     Visit #   Current / Total 1 12   Time   In / Out 200 245   Pain   In / Out 1 1   Subjective Functional Status/Changes: Pt reports left > right chronic shoulder pain that began about 8-9 years ago without SLY. Recent cortisone injection has improved right shoulder motion and pain. However still having difficulty with left shoulder pain and lifting overhead. Recently completed left trigger finger release and will soon be having right hand completed. She is unaware of any planes to complete occupational therapy. X-rays were unremarkable. US revealed bursitis. Occasional pain that radiates to left biceps. If she sleeps on her left arm, she wakes with numbness. Treatment Area: Left shoulder pain [M25.512]  Right shoulder pain [M25.511]    SUBJECTIVE  Pain Level (0-10 scale): 1  PMHx/Surgical Hx: HTN, Type 2 Diabetes, Bilateral Mensicus tears, Coronary Artery Disease  Work Hx: Retired last week from [employer]  Pt Goals: \"be rid of pain\"      22 min [x]Eval                  []Re-Eval     Therapeutic Procedures: Tx Min Billable or 1:1 Min (if diff from Tx Min) Procedure, Rationale, Specifics   13  78880 Therapeutic Exercise (timed):  increase ROM, strength, coordination, balance, and proprioception to improve patient's ability to progress to PLOF and address remaining functional goals. (see flow sheet as applicable)     Details if applicable:     10  39387 Manual Therapy (timed):  decrease pain, increase ROM, and increase tissue extensibility to improve patient's ability to progress to PLOF and address remaining functional goals. The manual therapy interventions were performed at a separate and distinct time from the therapeutic activities interventions .  (see flow sheet as

## 2023-09-18 NOTE — PROGRESS NOTES
[address] [facility] PHYSICAL THERAPY  [address] [place] [address]. [place] [address] Phone: [phone] Fax   Plan of Care / Statement of Necessity for Physical Therapy Services     Patient Name: Edrie Goltz : 1955   Medical   Diagnosis: Left shoulder pain [M25.512]  Right shoulder pain [M25.511] Treatment Diagnosis:     M25.511  RIGHT SHOULDER PAIN and M25.512  LEFT SHOULDER PAIN     Onset Date: Chronic Payor:  Payor: Ruy Mccollum / Plan: MEDICARE PART A AND B / Product Type: *No Product type* /    Referral Source: Scarlett Machado MD [facility]): 2023   Prior Hospitalization: See medical history Provider #: [id number]   Prior Level of Function: Retired as of last week   Comorbidities: HTN, Type 2 Diabetes, Bilateral Mensicus tears, Coronary Artery Disease   Medications: Verified on Patient Summary List     Assessment / key information:  Pt is a 79year old female who presents to PT today bilateral shoulder pain that is chronic in nature. Right shoulder symptoms improved after cortisone injection recently. However left shoulder continues to be pain and difficult to reach overhead. Patient with a Functional Status score of 56 on FOTO (Focused on Therapeutic Outcomes), which corresponds to a functional limitation of 44%. Patient will benefit from skilled PT services to address these issues. Pt was educated regarding their diagnosis and prognosis.  Thank you for this referral.        Physical Therapy Evaluation - Shoulder        Cervical ROM AROM   Forward flexion 35   Extension WFL   SB right 28   SB left  25   Rotation right 55   Rotation left 42                                                                                  Shoulder AROM(PROM) Left Right   Flexion 106 125   Extension 44 50   Scaption/ABD 75 128   ER @ 0 Degrees 50 62   Notes: left shoulder pain @ end ranges of flexion, abduction, ER and IR     Strength:

## 2023-09-21 ENCOUNTER — HOSPITAL ENCOUNTER (OUTPATIENT)
Facility: HOSPITAL | Age: 68
Setting detail: RECURRING SERIES
Discharge: HOME | End: 2023-09-24
Payer: MEDICARE

## 2023-09-21 PROCEDURE — 97530 THERAPEUTIC ACTIVITIES: CPT

## 2023-09-21 PROCEDURE — 97110 THERAPEUTIC EXERCISES: CPT

## 2023-09-21 PROCEDURE — 97140 MANUAL THERAPY 1/> REGIONS: CPT

## 2023-09-21 NOTE — PROGRESS NOTES
PM    Future Appointments   Date Time Provider [address]   9/26/2023  1:20 PM Liberty Poster, PT [address]   9/29/2023 12:40 PM Liberty Poster, PT [address]   10/3/2023  1:20 PM Liberty Poster, PT [address]   10/5/2023  2:00 PM Liberty Poster, PT [address]   10/9/2023  2:00 PM Raymond Mooney, PT [address]   10/12/2023  2:00 PM Raymond Mooney, PT [address]   10/17/2023  2:00 PM Imer Chisholm, PTA [facility] [address] [place] [address]   10/19/2023  2:00 PM Lydia Esposito, PT [address]   10/24/2023  2:00 PM Nancy Mandujano PTA [facility] [address] [place] [address]   10/26/2023  2:00 PM Nancy Mandujano PTA [facility] [address] [place] [address]

## 2023-09-26 ENCOUNTER — HOSPITAL ENCOUNTER (OUTPATIENT)
Facility: HOSPITAL | Age: 68
Setting detail: RECURRING SERIES
Discharge: HOME | End: 2023-09-29
Payer: MEDICARE

## 2023-09-26 PROCEDURE — 97530 THERAPEUTIC ACTIVITIES: CPT

## 2023-09-26 PROCEDURE — 97140 MANUAL THERAPY 1/> REGIONS: CPT

## 2023-09-26 PROCEDURE — 97110 THERAPEUTIC EXERCISES: CPT

## 2023-09-26 NOTE — PROGRESS NOTES
therapeutic activities interventions . (see flow sheet as applicable)     Details if applicable: grade 2-3 post/inf GH joint mobs to B shoulders, PROM for flexion, abduction, and ER to B shoulders    44  [facility] BC Totals Reminder: bill using total billable min of TIMED therapeutic procedures (example: do not include dry needle or estim unattended, both untimed codes, in totals to left)  8-22 min = 1 unit; 23-37 min = 2 units; 38-52 min = 3 units; 53-67 min = 4 units; 68-82 min = 5 units   Total Total     [x]  Patient Education billed concurrently with other procedures   [x] Review HEP    [x] Progressed/Changed HEP, detail:  Access Code: Chanel Liz  URL: [email or web address]  Date: 09/26/2023  Prepared by: Alisa Chacon    Exercises  - Shoulder Flexion Wall Slide with Towel  - 1 x daily - 7 x weekly - 1-2 sets - 10 reps  - Isometric Shoulder Flexion at Wall  - 1 x daily - 7 x weekly - 1 sets - 5-10 reps - 5 seconds hold  - Isometric Shoulder Extension at Wall  - 1 x daily - 7 x weekly - 1 sets - 5-10 reps - 5 seconds hold  - Isometric Shoulder Abduction at Wall  - 1 x daily - 7 x weekly - 1 sets - 5-10 reps - 5 seconds hold  - Isometric Shoulder External Rotation at Wall  - 1 x daily - 7 x weekly - 1 sets - 5-10 reps - 5 seconds hold  - Standing Isometric Shoulder Internal Rotation at Doorway  - 1 x daily - 7 x weekly - 1 sets - 5-10 reps - 5 seconds hold  [] Other detail:       Objective Information/Functional Measures/Assessment    Exercises per flowsheet. Added pulleys per flowsheet. Held thoracic extension due to reports of low back pain after 2 reps. Able to do 10 reps of open book B today without complaints of pain or popping.      Patient will continue to benefit from skilled PT / OT services to modify and progress therapeutic interventions, analyze and address functional mobility deficits, analyze and address ROM deficits, analyze and address strength deficits, analyze and

## 2023-09-29 ENCOUNTER — APPOINTMENT (OUTPATIENT)
Facility: HOSPITAL | Age: 68
End: 2023-09-29
Payer: MEDICARE

## 2023-10-03 ENCOUNTER — HOSPITAL ENCOUNTER (OUTPATIENT)
Facility: HOSPITAL | Age: 68
Setting detail: RECURRING SERIES
Discharge: HOME | End: 2023-10-06
Payer: MEDICARE

## 2023-10-03 PROCEDURE — 97530 THERAPEUTIC ACTIVITIES: CPT

## 2023-10-03 PROCEDURE — 97112 NEUROMUSCULAR REEDUCATION: CPT

## 2023-10-03 PROCEDURE — 97110 THERAPEUTIC EXERCISES: CPT

## 2023-10-03 NOTE — PROGRESS NOTES
PHYSICAL THERAPY - DAILY TREATMENT NOTE (updated )    Patient Name: Davon Seals    Date: 10/3/2023    : 1955  Insurance: Payor: MEDICARE / Plan: MEDICARE PART A AND B / Product Type: *No Product type* /      Patient  verified yes     Visit #   Current / Total 4 12   Time   In / Out 1:15 1:58   Pain   In / Out 0/10 0/10   Subjective Functional Status/Changes: \"I'm sorry I couldn't come last time. I had a headache and was not feeling good. \"  Reports she has noticed more mobility in her shoulders but states her left shoulder is still more painful. Reports no pain after last PT session. TREATMENT AREA =  Left shoulder pain [M25.512]  Right shoulder pain [M25.511]    Next PN/ RC due: 10/18/2023, RC: 2023  Auth due: 2023    OBJECTIVE    Therapeutic Procedures: Tx Min Billable or 1:1 Min (if diff from Tx Min) Procedure, Rationale, Specifics   10  83194 Therapeutic Exercise (timed):  increase ROM, strength, coordination, balance, and proprioception to improve patient's ability to progress to PLOF and address remaining functional goals. (see flow sheet as applicable)     Details if applicable:  UBE, shoulder isometrics     10  08120 Therapeutic Activity (timed):  use of dynamic activities replicating functional movements to increase ROM, strength, coordination, balance, and proprioception in order to improve patient's ability to progress to PLOF and address remaining functional goals. (see flow sheet as applicable)     Details if applicable:  pulleys, wall wash, supine AAROM shoulder flexion, open book   20  19918 Neuromuscular Re-Education (timed):  improve balance, coordination, kinesthetic sense, posture, core stability and proprioception to improve patient's ability to develop conscious control of individual muscles and awareness of position of extremities in order to progress to PLOF and address remaining functional goals.  (see flow sheet as applicable)     Details if applicable:

## 2023-10-05 ENCOUNTER — HOSPITAL ENCOUNTER (OUTPATIENT)
Facility: HOSPITAL | Age: 68
Setting detail: RECURRING SERIES
Discharge: HOME | End: 2023-10-08
Payer: MEDICARE

## 2023-10-05 PROCEDURE — 97110 THERAPEUTIC EXERCISES: CPT

## 2023-10-05 PROCEDURE — 97530 THERAPEUTIC ACTIVITIES: CPT

## 2023-10-05 PROCEDURE — 97112 NEUROMUSCULAR REEDUCATION: CPT

## 2023-10-05 NOTE — PROGRESS NOTES
PHYSICAL THERAPY - DAILY TREATMENT NOTE (updated )    Patient Name: Sudheer Harrington    Date: 10/5/2023    : 1955  Insurance: Payor: MEDICARE / Plan: MEDICARE PART A AND B / Product Type: *No Product type* /      Patient  verified yes     Visit #   Current / Total 5 12   Time   In / Out 2:03 2:41   Pain   In / Out 0/10 0/10   Subjective Functional Status/Changes: \"I was actually able to get my left hand in my back pocket yesterday. \"     TREATMENT AREA =  Left shoulder pain [M25.512]  Right shoulder pain [M25.511]    Next PN/ RC due: 10/18/2023, RC: 2023  Auth due: 2023    OBJECTIVE    Therapeutic Procedures: Tx Min Billable or 1:1 Min (if diff from Tx Min) Procedure, Rationale, Specifics   10  82817 Therapeutic Exercise (timed):  increase ROM, strength, coordination, balance, and proprioception to improve patient's ability to progress to PLOF and address remaining functional goals. (see flow sheet as applicable)     Details if applicable:  UBE, tband IR/ER, open book, supine AAROM shoulder flexion     10  71490 Therapeutic Activity (timed):  use of dynamic activities replicating functional movements to increase ROM, strength, coordination, balance, and proprioception in order to improve patient's ability to progress to PLOF and address remaining functional goals. (see flow sheet as applicable)     Details if applicable:  tband rows/ext, wall wash, pulleys, supine chest press   18  04368 Neuromuscular Re-Education (timed):  improve balance, coordination, kinesthetic sense, posture, core stability and proprioception to improve patient's ability to develop conscious control of individual muscles and awareness of position of extremities in order to progress to PLOF and address remaining functional goals.  (see flow sheet as applicable)     Details if applicable:  standing scap retraction, wall push up, supine scap stabs, supine arm lengthener   45  MC BC Totals Reminder: bill using total billable

## 2023-10-09 ENCOUNTER — HOSPITAL ENCOUNTER (OUTPATIENT)
Facility: HOSPITAL | Age: 68
Setting detail: RECURRING SERIES
Discharge: HOME | End: 2023-10-12
Payer: MEDICARE

## 2023-10-09 PROCEDURE — 97110 THERAPEUTIC EXERCISES: CPT

## 2023-10-09 PROCEDURE — 97112 NEUROMUSCULAR REEDUCATION: CPT

## 2023-10-09 PROCEDURE — 97530 THERAPEUTIC ACTIVITIES: CPT

## 2023-10-09 NOTE — PROGRESS NOTES
PHYSICAL THERAPY - DAILY TREATMENT NOTE (updated )    Patient Name: Edrie Goltz    Date: 10/9/2023    : 1955  Insurance: Payor: MEDICARE / Plan: MEDICARE PART A AND B / Product Type: *No Product type* /      Patient  verified yes     Visit #   Current / Total 6 12   Time   In / Out 2:01 2:40   Pain   In / Out 2/10 1-2/10   Subjective Functional Status/Changes: \"My left shoulder is bothering me a little today. I don't know if it's the colder weather or what. \"     TREATMENT AREA =  Left shoulder pain [M25.512]  Right shoulder pain [M25.511]    Next PN/ RC due: 10/18/2023, RC: 2023  Auth due: 2023    OBJECTIVE    Therapeutic Procedures: Tx Min Billable or 1:1 Min (if diff from Tx Min) Procedure, Rationale, Specifics   13  31556 Therapeutic Exercise (timed):  increase ROM, strength, coordination, balance, and proprioception to improve patient's ability to progress to PLOF and address remaining functional goals. (see flow sheet as applicable)     Details if applicable:  UBE, tband IR/ER, open book, supine AAROM shoulder flexion     13  63315 Therapeutic Activity (timed):  use of dynamic activities replicating functional movements to increase ROM, strength, coordination, balance, and proprioception in order to improve patient's ability to progress to PLOF and address remaining functional goals. (see flow sheet as applicable)     Details if applicable:  tband rows/ext, wall wash, pulleys, supine chest press, stand shoulder 3 way   13  11586 Neuromuscular Re-Education (timed):  improve balance, coordination, kinesthetic sense, posture, core stability and proprioception to improve patient's ability to develop conscious control of individual muscles and awareness of position of extremities in order to progress to PLOF and address remaining functional goals.  (see flow sheet as applicable)     Details if applicable:  wall push up, supine scap stabs, supine arm lengthener   44  MC BC Totals Reminder:

## 2023-10-12 ENCOUNTER — HOSPITAL ENCOUNTER (OUTPATIENT)
Facility: HOSPITAL | Age: 68
Setting detail: RECURRING SERIES
Discharge: HOME | End: 2023-10-15
Payer: MEDICARE

## 2023-10-12 PROCEDURE — 97530 THERAPEUTIC ACTIVITIES: CPT

## 2023-10-12 PROCEDURE — 97110 THERAPEUTIC EXERCISES: CPT

## 2023-10-12 PROCEDURE — 97112 NEUROMUSCULAR REEDUCATION: CPT

## 2023-10-12 NOTE — PROGRESS NOTES
45  [facility] Totals Reminder: bill using total billable min of TIMED therapeutic procedures (example: do not include dry needle or estim unattended, both untimed codes, in totals to left)  8-22 min = 1 unit; 23-37 min = 2 units; 38-52 min = 3 units; 53-67 min = 4 units; 68-82 min = 5 units   Total Total     [x]  Patient Education billed concurrently with other procedures   [x] Review HEP    [] Progressed/Changed HEP, detail:    [] Other detail:       Objective Information/Functional Measures/Assessment    Added supine scap stab #3,4  Increased reps with several exercises per flowsheet. Patient will continue to benefit from skilled PT / OT services to modify and progress therapeutic interventions, analyze and address functional mobility deficits, analyze and address ROM deficits, analyze and address strength deficits, analyze and address soft tissue restrictions, analyze and cue for proper movement patterns, analyze and modify for postural abnormalities, and instruct in home and community integration to address functional deficits and attain remaining goals. Progress toward goals / Updated goals:  []  See Progress Note/Recertification    Short Term Goals: To be accomplished in 4 weeks   Pt will be compliant with HEP for symptom management at home. Current status: compliant with HEP     Long Term Goals: To be accomplished in 8-12 weeks   Pt will be independent with HEP at D/C for self management. Current status: compliant with HEP  2.  Pt will demonstrate left shoulder abduction strength to at least 5/5 to engage in age appropriate activities. Current status: cont standing shoulder abduction. 3.  Pt will demonstrate left shoulder flexion AROM of at least 125 degrees to reach overhead. Current status: cont ROM exercises     4. Pt will increase FOTO Functional Status score to 66 to decrease functional limitations.   Current status: NT       PLAN  yes Continue plan of care  [x]  Upgrade activities as

## 2023-10-17 ENCOUNTER — HOSPITAL ENCOUNTER (OUTPATIENT)
Facility: HOSPITAL | Age: 68
Setting detail: RECURRING SERIES
Discharge: HOME | End: 2023-10-20
Payer: MEDICARE

## 2023-10-17 PROCEDURE — 97530 THERAPEUTIC ACTIVITIES: CPT

## 2023-10-17 PROCEDURE — 97110 THERAPEUTIC EXERCISES: CPT

## 2023-10-17 NOTE — PROGRESS NOTES
[address] [facility] [facility]  [address]   Phone: [phone] Fax: [phone]  PROGRESS NOTE  Patient Name: Amanuel Schaefer : 1955   Treatment/Medical Diagnosis: Left shoulder pain [M25.512]  Right shoulder pain [M25.511]   Referral Source: Pérez Duque MD     Date of Initial Visit: 2023 Attended Visits: 8 Missed Visits: 1     SUMMARY OF TREATMENT  Treatment consisted of therapeutic exercises for B shoulder ROM and strengthening, B scapular strengthening, manual therapy of joint mobs and PROM, patient education, and home exercise program.  CURRENT STATUS  Patient is progressing very well with physical  therapy. She reports a 100%  improvement in right shoulder and a 90% improvement in left shoulder since beginning therapy. However, she still reports pain in left shoulder when reaching overhead. Cervical ROM AROM   Forward flexion 65   Extension 40   SB right 35   SB left  35   Rotation right 60   Rotation left 60                                                                                  Shoulder AROM(PROM) Left Right   Flexion 122 150   Extension 40 50   Scaption/ABD 90 125   ER @ 0 Degrees 60 75        Strength:                                                                       L (1-5) R (1-5)   Flexors 4+ 4+   Abductors 4 4+   External Rotators 5 5   Internal Rotators 5 5   Elbow Flexion 5 5   Elbow Extension 5 5      FOTO (Focused on Therapeutic Outcome): 61/100, stage 4 which indicates good shoulder function (improved from 56/100 - stage 3 which indicates fair shoulder function)      Progress to Goals:   Short Term Goals: To be accomplished in 4 weeks   Pt will be compliant with HEP for symptom management at home. Current status: compliant with HEP  GOAL MET     Long Term Goals: To be accomplished in 8-12 weeks   Pt will be independent with HEP at D/C for self management.    Current status: compliant with HEP
decrease functional limitations.   Current status: 61/100 Progressing toward goal    PLAN  yes Continue plan of care  [x]  Upgrade activities as tolerated  []  Discharge due to :  []  Other:    Vista Poster, PT    10/17/2023    2:07 PM    Future Appointments   Date Time Provider [address]   10/19/2023  2:00 PM Lydia Esposito, PT [facility]   10/24/2023  2:00 PM Imer Chisholm PTA [facility]   10/26/2023  2:00 PM Kinjal Mandujano PTA [facility]

## 2023-10-19 ENCOUNTER — HOSPITAL ENCOUNTER (OUTPATIENT)
Facility: HOSPITAL | Age: 68
Setting detail: RECURRING SERIES
Discharge: HOME | End: 2023-10-22
Payer: MEDICARE

## 2023-10-19 PROCEDURE — 97112 NEUROMUSCULAR REEDUCATION: CPT

## 2023-10-19 PROCEDURE — 97110 THERAPEUTIC EXERCISES: CPT

## 2023-10-19 PROCEDURE — 97530 THERAPEUTIC ACTIVITIES: CPT

## 2023-10-19 NOTE — PROGRESS NOTES
PHYSICAL / OCCUPATIONAL THERAPY - DAILY TREATMENT NOTE (updated )    Patient Name: Tanner Hobson    Date: 10/19/2023    : 1955  Insurance: Payor: MEDICARE / Plan: MEDICARE PART A AND B / Product Type: *No Product type* /      Patient  verified Yes     Visit #   Current / Total 9 12   Time   In / Out 1:50 2:28   Pain   In / Out 0 0   Subjective Functional Status/Changes: Pt reports compliance with HEP. Next PN/ RC due 2023, RC 2023  Auth due 2023    TREATMENT AREA =  Left shoulder pain [M25.512]  Right shoulder pain [M25.511]    OBJECTIVE    Therapeutic Procedures: Tx Min Billable or 1:1 Min (if diff from Tx Min) Procedure, Rationale, Specifics   10  34601 Therapeutic Exercise (timed):  increase ROM, strength, coordination, balance, and proprioception to improve patient's ability to progress to PLOF and address remaining functional goals. (see flow sheet as applicable)     Details if applicable:  UBE retro, t-band IR/ER, hands clasped shoulder flex     12  16531 Neuromuscular Re-Education (timed):  improve balance, coordination, kinesthetic sense, posture, core stability and proprioception to improve patient's ability to develop conscious control of individual muscles and awareness of position of extremities in order to progress to PLOF and address remaining functional goals. (see flow sheet as applicable)     Details if applicable:  T-band row/ext, supine serratus punch, supine scap stabs, supine arm lengthener   16  09182 Therapeutic Activity (timed):  use of dynamic activities replicating functional movements to increase ROM, strength, coordination, balance, and proprioception in order to improve patient's ability to progress to PLOF and address remaining functional goals.   (see flow sheet as applicable)     Details if applicable:  Pulleys, stand shoulder 3-way, wall wash, open book, supine chest press             38  [facility] BC Totals Reminder: bill using total billable min of

## 2023-10-24 ENCOUNTER — APPOINTMENT (OUTPATIENT)
Facility: HOSPITAL | Age: 68
End: 2023-10-24
Payer: MEDICARE

## 2023-10-24 NOTE — PROGRESS NOTES
[address] [facility] [facility]  [address] [facility] [address] [place]  Phone: [phone] Fax [phone] SUMMARY  Patient Name: Gill Moreira : 1955   Treatment/Medical Diagnosis: Left shoulder pain [M25.512]  Right shoulder pain [M25.511]   Referral Source: Dalton Noriega MD     Date of Initial Visit: 2023 Attended Visits: 9 Missed Visits: 1     SUMMARY OF TREATMENT  Treatment consisted of therapeutic exercises for B shoulder ROM and strengthening, B scapular strengthening, manual therapy of joint mobs and PROM, patient education, and home exercise program.  CURRENT STATUS  Please refer to  progress note dated 10/17/2023 for objective information. Patient called today requesting discharge from physical therapy. RECOMMENDATIONS  Discharge from PT at this time. If you have any questions/comments please contact us directly at [phone]. Thank you for allowing us to assist in the care of your patient.   PTA signature       Therapist Signature: Sun Nice PT Date: 10/24/2023   Reporting Period: 10/17/2023 - 10/24/2023 Time: 10:53 AM

## 2023-10-26 ENCOUNTER — APPOINTMENT (OUTPATIENT)
Facility: HOSPITAL | Age: 68
End: 2023-10-26
Payer: MEDICARE